# Patient Record
Sex: FEMALE | Race: WHITE | NOT HISPANIC OR LATINO
[De-identification: names, ages, dates, MRNs, and addresses within clinical notes are randomized per-mention and may not be internally consistent; named-entity substitution may affect disease eponyms.]

---

## 2017-01-26 ENCOUNTER — RESULT REVIEW (OUTPATIENT)
Age: 32
End: 2017-01-26

## 2017-01-27 ENCOUNTER — INPATIENT (INPATIENT)
Facility: HOSPITAL | Age: 32
LOS: 1 days | Discharge: HOME | End: 2017-01-29
Attending: OBSTETRICS & GYNECOLOGY | Admitting: OBSTETRICS & GYNECOLOGY
Payer: COMMERCIAL

## 2017-01-27 VITALS
SYSTOLIC BLOOD PRESSURE: 94 MMHG | RESPIRATION RATE: 18 BRPM | HEART RATE: 56 BPM | DIASTOLIC BLOOD PRESSURE: 62 MMHG | TEMPERATURE: 98 F

## 2017-01-27 DIAGNOSIS — O47.1 FALSE LABOR AT OR AFTER 37 COMPLETED WEEKS OF GESTATION: ICD-10-CM

## 2017-01-27 LAB
BASOPHILS # BLD AUTO: 0 K/UL — SIGNIFICANT CHANGE UP (ref 0–0.2)
BASOPHILS NFR BLD AUTO: 0 % — SIGNIFICANT CHANGE UP (ref 0–2)
BLD GP AB SCN SERPL QL: NEGATIVE — SIGNIFICANT CHANGE UP
EOSINOPHIL # BLD AUTO: 0 K/UL — SIGNIFICANT CHANGE UP (ref 0–0.5)
EOSINOPHIL NFR BLD AUTO: 0.2 % — SIGNIFICANT CHANGE UP (ref 0–6)
HCT VFR BLD CALC: 36.7 % — SIGNIFICANT CHANGE UP (ref 34.5–45)
HGB BLD-MCNC: 12.7 G/DL — SIGNIFICANT CHANGE UP (ref 11.5–15.5)
LYMPHOCYTES # BLD AUTO: 1.6 K/UL — SIGNIFICANT CHANGE UP (ref 1–3.3)
LYMPHOCYTES # BLD AUTO: 12.4 % — LOW (ref 13–44)
MCHC RBC-ENTMCNC: 33.4 PG — SIGNIFICANT CHANGE UP (ref 27–34)
MCHC RBC-ENTMCNC: 34.7 GM/DL — SIGNIFICANT CHANGE UP (ref 32–36)
MCV RBC AUTO: 96.3 FL — SIGNIFICANT CHANGE UP (ref 80–100)
MONOCYTES # BLD AUTO: 0.4 K/UL — SIGNIFICANT CHANGE UP (ref 0–0.9)
MONOCYTES NFR BLD AUTO: 3 % — SIGNIFICANT CHANGE UP (ref 2–14)
NEUTROPHILS # BLD AUTO: 10.9 K/UL — HIGH (ref 1.8–7.4)
NEUTROPHILS NFR BLD AUTO: 84.4 % — HIGH (ref 43–77)
PLATELET # BLD AUTO: 150 K/UL — SIGNIFICANT CHANGE UP (ref 150–400)
RBC # BLD: 3.81 M/UL — SIGNIFICANT CHANGE UP (ref 3.8–5.2)
RBC # FLD: 11.3 % — SIGNIFICANT CHANGE UP (ref 10.3–14.5)
RH IG SCN BLD-IMP: POSITIVE — SIGNIFICANT CHANGE UP
T PALLIDUM AB TITR SER: NEGATIVE — SIGNIFICANT CHANGE UP
WBC # BLD: 12.9 K/UL — HIGH (ref 3.8–10.5)
WBC # FLD AUTO: 12.9 K/UL — HIGH (ref 3.8–10.5)

## 2017-01-27 PROCEDURE — 88307 TISSUE EXAM BY PATHOLOGIST: CPT | Mod: 26

## 2017-01-27 RX ORDER — OXYTOCIN 10 UNIT/ML
41.67 VIAL (ML) INJECTION
Qty: 20 | Refills: 0 | Status: DISCONTINUED | OUTPATIENT
Start: 2017-01-27 | End: 2017-01-27

## 2017-01-27 RX ORDER — HYDROCORTISONE 1 %
1 OINTMENT (GRAM) TOPICAL EVERY 4 HOURS
Qty: 0 | Refills: 0 | Status: DISCONTINUED | OUTPATIENT
Start: 2017-01-27 | End: 2017-01-27

## 2017-01-27 RX ORDER — SODIUM CHLORIDE 9 MG/ML
3 INJECTION INTRAMUSCULAR; INTRAVENOUS; SUBCUTANEOUS EVERY 8 HOURS
Qty: 0 | Refills: 0 | Status: DISCONTINUED | OUTPATIENT
Start: 2017-01-27 | End: 2017-01-27

## 2017-01-27 RX ORDER — GLYCERIN ADULT
1 SUPPOSITORY, RECTAL RECTAL AT BEDTIME
Qty: 0 | Refills: 0 | Status: DISCONTINUED | OUTPATIENT
Start: 2017-01-27 | End: 2017-01-29

## 2017-01-27 RX ORDER — DIBUCAINE 1 %
1 OINTMENT (GRAM) RECTAL EVERY 4 HOURS
Qty: 0 | Refills: 0 | Status: DISCONTINUED | OUTPATIENT
Start: 2017-01-27 | End: 2017-01-29

## 2017-01-27 RX ORDER — SODIUM CHLORIDE 9 MG/ML
3 INJECTION INTRAMUSCULAR; INTRAVENOUS; SUBCUTANEOUS EVERY 8 HOURS
Qty: 0 | Refills: 0 | Status: DISCONTINUED | OUTPATIENT
Start: 2017-01-27 | End: 2017-01-29

## 2017-01-27 RX ORDER — IBUPROFEN 200 MG
600 TABLET ORAL EVERY 6 HOURS
Qty: 0 | Refills: 0 | Status: DISCONTINUED | OUTPATIENT
Start: 2017-01-27 | End: 2017-01-29

## 2017-01-27 RX ORDER — DOCUSATE SODIUM 100 MG
100 CAPSULE ORAL
Qty: 0 | Refills: 0 | Status: DISCONTINUED | OUTPATIENT
Start: 2017-01-27 | End: 2017-01-29

## 2017-01-27 RX ORDER — ACETAMINOPHEN 500 MG
975 TABLET ORAL EVERY 6 HOURS
Qty: 0 | Refills: 0 | Status: DISCONTINUED | OUTPATIENT
Start: 2017-01-27 | End: 2017-01-29

## 2017-01-27 RX ORDER — AER TRAVELER 0.5 G/1
1 SOLUTION RECTAL; TOPICAL EVERY 4 HOURS
Qty: 0 | Refills: 0 | Status: DISCONTINUED | OUTPATIENT
Start: 2017-01-27 | End: 2017-01-29

## 2017-01-27 RX ORDER — SIMETHICONE 80 MG/1
80 TABLET, CHEWABLE ORAL EVERY 6 HOURS
Qty: 0 | Refills: 0 | Status: DISCONTINUED | OUTPATIENT
Start: 2017-01-27 | End: 2017-01-29

## 2017-01-27 RX ORDER — TETANUS TOXOID, REDUCED DIPHTHERIA TOXOID AND ACELLULAR PERTUSSIS VACCINE, ADSORBED 5; 2.5; 8; 8; 2.5 [IU]/.5ML; [IU]/.5ML; UG/.5ML; UG/.5ML; UG/.5ML
0.5 SUSPENSION INTRAMUSCULAR ONCE
Qty: 0 | Refills: 0 | Status: COMPLETED | OUTPATIENT
Start: 2017-01-27

## 2017-01-27 RX ORDER — OXYCODONE HYDROCHLORIDE 5 MG/1
5 TABLET ORAL
Qty: 0 | Refills: 0 | Status: DISCONTINUED | OUTPATIENT
Start: 2017-01-27 | End: 2017-01-29

## 2017-01-27 RX ORDER — CITRIC ACID/SODIUM CITRATE 300-500 MG
15 SOLUTION, ORAL ORAL EVERY 4 HOURS
Qty: 0 | Refills: 0 | Status: DISCONTINUED | OUTPATIENT
Start: 2017-01-27 | End: 2017-01-27

## 2017-01-27 RX ORDER — DIBUCAINE 1 %
1 OINTMENT (GRAM) RECTAL EVERY 4 HOURS
Qty: 0 | Refills: 0 | Status: DISCONTINUED | OUTPATIENT
Start: 2017-01-27 | End: 2017-01-27

## 2017-01-27 RX ORDER — SODIUM CHLORIDE 9 MG/ML
1000 INJECTION, SOLUTION INTRAVENOUS
Qty: 0 | Refills: 0 | Status: DISCONTINUED | OUTPATIENT
Start: 2017-01-27 | End: 2017-01-27

## 2017-01-27 RX ORDER — LANOLIN
1 OINTMENT (GRAM) TOPICAL EVERY 6 HOURS
Qty: 0 | Refills: 0 | Status: DISCONTINUED | OUTPATIENT
Start: 2017-01-27 | End: 2017-01-29

## 2017-01-27 RX ORDER — KETOROLAC TROMETHAMINE 30 MG/ML
30 SYRINGE (ML) INJECTION ONCE
Qty: 0 | Refills: 0 | Status: DISCONTINUED | OUTPATIENT
Start: 2017-01-27 | End: 2017-01-27

## 2017-01-27 RX ORDER — PRAMOXINE HYDROCHLORIDE 150 MG/15G
1 AEROSOL, FOAM RECTAL EVERY 4 HOURS
Qty: 0 | Refills: 0 | Status: DISCONTINUED | OUTPATIENT
Start: 2017-01-27 | End: 2017-01-27

## 2017-01-27 RX ORDER — OXYTOCIN 10 UNIT/ML
41.67 VIAL (ML) INJECTION
Qty: 20 | Refills: 0 | Status: DISCONTINUED | OUTPATIENT
Start: 2017-01-27 | End: 2017-01-29

## 2017-01-27 RX ORDER — HYDROCORTISONE 1 %
1 OINTMENT (GRAM) TOPICAL EVERY 4 HOURS
Qty: 0 | Refills: 0 | Status: DISCONTINUED | OUTPATIENT
Start: 2017-01-27 | End: 2017-01-29

## 2017-01-27 RX ORDER — OXYTOCIN 10 UNIT/ML
333.33 VIAL (ML) INJECTION
Qty: 20 | Refills: 0 | Status: DISCONTINUED | OUTPATIENT
Start: 2017-01-27 | End: 2017-01-27

## 2017-01-27 RX ORDER — MAGNESIUM HYDROXIDE 400 MG/1
30 TABLET, CHEWABLE ORAL
Qty: 0 | Refills: 0 | Status: DISCONTINUED | OUTPATIENT
Start: 2017-01-27 | End: 2017-01-29

## 2017-01-27 RX ORDER — DIPHENHYDRAMINE HCL 50 MG
25 CAPSULE ORAL EVERY 6 HOURS
Qty: 0 | Refills: 0 | Status: DISCONTINUED | OUTPATIENT
Start: 2017-01-27 | End: 2017-01-29

## 2017-01-27 RX ORDER — PRAMOXINE HYDROCHLORIDE 150 MG/15G
1 AEROSOL, FOAM RECTAL EVERY 4 HOURS
Qty: 0 | Refills: 0 | Status: DISCONTINUED | OUTPATIENT
Start: 2017-01-27 | End: 2017-01-29

## 2017-01-27 RX ORDER — SODIUM CHLORIDE 9 MG/ML
500 INJECTION, SOLUTION INTRAVENOUS ONCE
Qty: 0 | Refills: 0 | Status: COMPLETED | OUTPATIENT
Start: 2017-01-27 | End: 2017-01-27

## 2017-01-27 RX ORDER — AER TRAVELER 0.5 G/1
1 SOLUTION RECTAL; TOPICAL EVERY 4 HOURS
Qty: 0 | Refills: 0 | Status: DISCONTINUED | OUTPATIENT
Start: 2017-01-27 | End: 2017-01-27

## 2017-01-27 RX ORDER — OXYCODONE HYDROCHLORIDE 5 MG/1
5 TABLET ORAL EVERY 4 HOURS
Qty: 0 | Refills: 0 | Status: DISCONTINUED | OUTPATIENT
Start: 2017-01-27 | End: 2017-01-29

## 2017-01-27 RX ADMIN — Medication 30 MILLIGRAM(S): at 08:45

## 2017-01-27 RX ADMIN — Medication 125 MILLIUNIT(S)/MIN: at 06:07

## 2017-01-27 RX ADMIN — SODIUM CHLORIDE 1000 MILLILITER(S): 9 INJECTION, SOLUTION INTRAVENOUS at 05:53

## 2017-01-27 NOTE — PATIENT PROFILE OB - NS PRO CONTRA FLU CONTRAIND
Allergy sensitivity to eggs, thimerosal (if included in the vaccine), or any other component of the vaccine (i.e., aminoglycosides)

## 2017-01-28 ENCOUNTER — TRANSCRIPTION ENCOUNTER (OUTPATIENT)
Age: 32
End: 2017-01-28

## 2017-01-28 DIAGNOSIS — Z83.2 FAMILY HISTORY OF DISEASES OF THE BLOOD AND BLOOD-FORMING ORGANS AND CERTAIN DISORDERS INVOLVING THE IMMUNE MECHANISM: ICD-10-CM

## 2017-01-28 LAB
HCT VFR BLD CALC: 34.9 % — SIGNIFICANT CHANGE UP (ref 34.5–45)
HGB BLD-MCNC: 12 G/DL — SIGNIFICANT CHANGE UP (ref 11.5–15.5)

## 2017-01-28 RX ORDER — LEVOTHYROXINE SODIUM 125 MCG
75 TABLET ORAL DAILY
Qty: 0 | Refills: 0 | Status: DISCONTINUED | OUTPATIENT
Start: 2017-01-28 | End: 2017-01-29

## 2017-01-28 RX ORDER — LEVOTHYROXINE SODIUM 125 MCG
1 TABLET ORAL
Qty: 0 | Refills: 0 | COMMUNITY

## 2017-01-28 NOTE — DISCHARGE NOTE OB - CARE PLAN
Principal Discharge DX:	Vaginal delivery  Goal:	home  Instructions for follow-up, activity and diet:	as per PMD

## 2017-01-28 NOTE — DISCHARGE NOTE OB - CARE PROVIDER_API CALL
Radha Neal), Obstetrics and Gynecology  [address]  [place] [address]  Phone: [phone]  Fax: [phone]

## 2017-01-28 NOTE — DISCHARGE NOTE OB - MATERIALS PROVIDED
Breastfeeding Guide and Packet/Breastfeeding Log/Guide to Postpartum Care/Birth Certificate Instructions/Breastfeeding Mother’s Support Group Information/[facility] Hearing Screen Program/[facility]  Screening Program Breastfeeding Guide and Packet/[facility] Hearing Screen Program/Breastfeeding Log/Birth Certificate Instructions/[facility]  Screening Program/Shaken Baby Prevention Handout/Breastfeeding Mother’s Support Group Information/Guide to Postpartum Care

## 2017-01-28 NOTE — DISCHARGE NOTE OB - PATIENT PORTAL LINK FT
“You can access the FollowHealth Patient Portal, offered by [facility], by registering with the following website: [email or web address]”

## 2017-01-29 VITALS — HEART RATE: 48 BPM

## 2017-01-29 PROCEDURE — 86900 BLOOD TYPING SEROLOGIC ABO: CPT

## 2017-01-29 PROCEDURE — 59050 FETAL MONITOR W/REPORT: CPT

## 2017-01-29 PROCEDURE — 86850 RBC ANTIBODY SCREEN: CPT

## 2017-01-29 PROCEDURE — 86780 TREPONEMA PALLIDUM: CPT

## 2017-01-29 PROCEDURE — G0463: CPT

## 2017-01-29 PROCEDURE — 59025 FETAL NON-STRESS TEST: CPT

## 2017-01-29 PROCEDURE — 85018 HEMOGLOBIN: CPT

## 2017-01-29 PROCEDURE — 85027 COMPLETE CBC AUTOMATED: CPT

## 2017-01-29 PROCEDURE — 88307 TISSUE EXAM BY PATHOLOGIST: CPT

## 2017-01-29 PROCEDURE — 86901 BLOOD TYPING SEROLOGIC RH(D): CPT

## 2017-01-29 RX ORDER — TETANUS TOXOID, REDUCED DIPHTHERIA TOXOID AND ACELLULAR PERTUSSIS VACCINE, ADSORBED 5; 2.5; 8; 8; 2.5 [IU]/.5ML; [IU]/.5ML; UG/.5ML; UG/.5ML; UG/.5ML
0.5 SUSPENSION INTRAMUSCULAR ONCE
Qty: 0 | Refills: 0 | Status: DISCONTINUED | OUTPATIENT
Start: 2017-01-29 | End: 2017-01-29

## 2017-01-29 RX ADMIN — Medication 75 MICROGRAM(S): at 06:39

## 2017-02-02 ENCOUNTER — APPOINTMENT (OUTPATIENT)
Dept: ANTEPARTUM | Facility: CLINIC | Age: 32
End: 2017-02-02

## 2017-02-03 LAB — SURGICAL PATHOLOGY STUDY: SIGNIFICANT CHANGE UP

## 2017-12-05 ENCOUNTER — OUTPATIENT (OUTPATIENT)
Dept: OUTPATIENT SERVICES | Age: 32
LOS: 1 days | End: 2017-12-05

## 2017-12-05 PROBLEM — Z83.2 FAMILY HISTORY OF HEREDITARY SPHEROCYTOSIS: Status: ACTIVE | Noted: 2017-12-05

## 2018-05-24 ENCOUNTER — RESULT REVIEW (OUTPATIENT)
Age: 33
End: 2018-05-24

## 2018-06-22 ENCOUNTER — APPOINTMENT (OUTPATIENT)
Dept: ANTEPARTUM | Facility: CLINIC | Age: 33
End: 2018-06-22
Payer: COMMERCIAL

## 2018-06-22 ENCOUNTER — ASOB RESULT (OUTPATIENT)
Age: 33
End: 2018-06-22

## 2018-06-22 PROCEDURE — 76813 OB US NUCHAL MEAS 1 GEST: CPT

## 2018-06-22 PROCEDURE — 36416 COLLJ CAPILLARY BLOOD SPEC: CPT

## 2018-08-16 ENCOUNTER — APPOINTMENT (OUTPATIENT)
Dept: ANTEPARTUM | Facility: CLINIC | Age: 33
End: 2018-08-16
Payer: COMMERCIAL

## 2018-08-16 ENCOUNTER — ASOB RESULT (OUTPATIENT)
Age: 33
End: 2018-08-16

## 2018-08-16 PROCEDURE — 99241 OFFICE CONSULTATION NEW/ESTAB PATIENT 15 MIN: CPT | Mod: 25

## 2018-08-16 PROCEDURE — 76811 OB US DETAILED SNGL FETUS: CPT

## 2018-08-16 PROCEDURE — 76817 TRANSVAGINAL US OBSTETRIC: CPT | Mod: 59

## 2018-10-11 ENCOUNTER — ASOB RESULT (OUTPATIENT)
Age: 33
End: 2018-10-11

## 2018-10-11 ENCOUNTER — APPOINTMENT (OUTPATIENT)
Dept: ANTEPARTUM | Facility: CLINIC | Age: 33
End: 2018-10-11
Payer: COMMERCIAL

## 2018-10-11 PROCEDURE — 76816 OB US FOLLOW-UP PER FETUS: CPT

## 2018-11-12 ENCOUNTER — APPOINTMENT (OUTPATIENT)
Dept: ANTEPARTUM | Facility: CLINIC | Age: 33
End: 2018-11-12

## 2018-12-19 ENCOUNTER — APPOINTMENT (OUTPATIENT)
Dept: ANTEPARTUM | Facility: CLINIC | Age: 33
End: 2018-12-19
Payer: COMMERCIAL

## 2018-12-19 ENCOUNTER — ASOB RESULT (OUTPATIENT)
Age: 33
End: 2018-12-19

## 2018-12-19 PROCEDURE — 76816 OB US FOLLOW-UP PER FETUS: CPT

## 2019-01-05 ENCOUNTER — INPATIENT (INPATIENT)
Facility: HOSPITAL | Age: 34
LOS: 1 days | Discharge: HOME | End: 2019-01-07
Attending: OBSTETRICS & GYNECOLOGY | Admitting: OBSTETRICS & GYNECOLOGY
Payer: COMMERCIAL

## 2019-01-05 VITALS
TEMPERATURE: 98 F | RESPIRATION RATE: 19 BRPM | HEART RATE: 56 BPM | SYSTOLIC BLOOD PRESSURE: 107 MMHG | DIASTOLIC BLOOD PRESSURE: 54 MMHG

## 2019-01-05 DIAGNOSIS — Z34.80 ENCOUNTER FOR SUPERVISION OF OTHER NORMAL PREGNANCY, UNSPECIFIED TRIMESTER: ICD-10-CM

## 2019-01-05 DIAGNOSIS — Z3A.00 WEEKS OF GESTATION OF PREGNANCY NOT SPECIFIED: ICD-10-CM

## 2019-01-05 DIAGNOSIS — O26.899 OTHER SPECIFIED PREGNANCY RELATED CONDITIONS, UNSPECIFIED TRIMESTER: ICD-10-CM

## 2019-01-05 LAB
BASOPHILS # BLD AUTO: 0.1 K/UL — SIGNIFICANT CHANGE UP (ref 0–0.2)
BASOPHILS NFR BLD AUTO: 0.6 % — SIGNIFICANT CHANGE UP (ref 0–2)
BLD GP AB SCN SERPL QL: NEGATIVE — SIGNIFICANT CHANGE UP
EOSINOPHIL # BLD AUTO: 0.1 K/UL — SIGNIFICANT CHANGE UP (ref 0–0.5)
EOSINOPHIL NFR BLD AUTO: 1.2 % — SIGNIFICANT CHANGE UP (ref 0–6)
HCT VFR BLD CALC: 36.7 % — SIGNIFICANT CHANGE UP (ref 34.5–45)
HGB BLD-MCNC: 12.9 G/DL — SIGNIFICANT CHANGE UP (ref 11.5–15.5)
LYMPHOCYTES # BLD AUTO: 2.5 K/UL — SIGNIFICANT CHANGE UP (ref 1–3.3)
LYMPHOCYTES # BLD AUTO: 27.4 % — SIGNIFICANT CHANGE UP (ref 13–44)
MCHC RBC-ENTMCNC: 33.2 PG — SIGNIFICANT CHANGE UP (ref 27–34)
MCHC RBC-ENTMCNC: 35.1 GM/DL — SIGNIFICANT CHANGE UP (ref 32–36)
MCV RBC AUTO: 94.7 FL — SIGNIFICANT CHANGE UP (ref 80–100)
MONOCYTES # BLD AUTO: 0.5 K/UL — SIGNIFICANT CHANGE UP (ref 0–0.9)
MONOCYTES NFR BLD AUTO: 5 % — SIGNIFICANT CHANGE UP (ref 2–14)
NEUTROPHILS # BLD AUTO: 6 K/UL — SIGNIFICANT CHANGE UP (ref 1.8–7.4)
NEUTROPHILS NFR BLD AUTO: 65.7 % — SIGNIFICANT CHANGE UP (ref 43–77)
PLATELET # BLD AUTO: 169 K/UL — SIGNIFICANT CHANGE UP (ref 150–400)
RBC # BLD: 3.88 M/UL — SIGNIFICANT CHANGE UP (ref 3.8–5.2)
RBC # FLD: 11.8 % — SIGNIFICANT CHANGE UP (ref 10.3–14.5)
RH IG SCN BLD-IMP: POSITIVE — SIGNIFICANT CHANGE UP
WBC # BLD: 9.2 K/UL — SIGNIFICANT CHANGE UP (ref 3.8–10.5)
WBC # FLD AUTO: 9.2 K/UL — SIGNIFICANT CHANGE UP (ref 3.8–10.5)

## 2019-01-05 RX ORDER — PRAMOXINE HYDROCHLORIDE 150 MG/15G
1 AEROSOL, FOAM RECTAL EVERY 4 HOURS
Qty: 0 | Refills: 0 | Status: DISCONTINUED | OUTPATIENT
Start: 2019-01-05 | End: 2019-01-07

## 2019-01-05 RX ORDER — OXYTOCIN 10 UNIT/ML
41.67 VIAL (ML) INJECTION
Qty: 20 | Refills: 0 | Status: DISCONTINUED | OUTPATIENT
Start: 2019-01-05 | End: 2019-01-05

## 2019-01-05 RX ORDER — IBUPROFEN 200 MG
600 TABLET ORAL EVERY 6 HOURS
Qty: 0 | Refills: 0 | Status: DISCONTINUED | OUTPATIENT
Start: 2019-01-05 | End: 2019-01-07

## 2019-01-05 RX ORDER — HYDROCORTISONE 1 %
1 OINTMENT (GRAM) TOPICAL EVERY 4 HOURS
Qty: 0 | Refills: 0 | Status: DISCONTINUED | OUTPATIENT
Start: 2019-01-05 | End: 2019-01-07

## 2019-01-05 RX ORDER — OXYCODONE HYDROCHLORIDE 5 MG/1
5 TABLET ORAL EVERY 4 HOURS
Qty: 0 | Refills: 0 | Status: DISCONTINUED | OUTPATIENT
Start: 2019-01-05 | End: 2019-01-07

## 2019-01-05 RX ORDER — SODIUM CHLORIDE 9 MG/ML
1000 INJECTION, SOLUTION INTRAVENOUS
Qty: 0 | Refills: 0 | Status: DISCONTINUED | OUTPATIENT
Start: 2019-01-05 | End: 2019-01-05

## 2019-01-05 RX ORDER — HYDROCORTISONE 1 %
1 OINTMENT (GRAM) TOPICAL EVERY 4 HOURS
Qty: 0 | Refills: 0 | Status: DISCONTINUED | OUTPATIENT
Start: 2019-01-05 | End: 2019-01-05

## 2019-01-05 RX ORDER — DOCUSATE SODIUM 100 MG
100 CAPSULE ORAL
Qty: 0 | Refills: 0 | Status: DISCONTINUED | OUTPATIENT
Start: 2019-01-05 | End: 2019-01-07

## 2019-01-05 RX ORDER — PRAMOXINE HYDROCHLORIDE 150 MG/15G
1 AEROSOL, FOAM RECTAL EVERY 4 HOURS
Qty: 0 | Refills: 0 | Status: DISCONTINUED | OUTPATIENT
Start: 2019-01-05 | End: 2019-01-05

## 2019-01-05 RX ORDER — DIBUCAINE 1 %
1 OINTMENT (GRAM) RECTAL EVERY 4 HOURS
Qty: 0 | Refills: 0 | Status: DISCONTINUED | OUTPATIENT
Start: 2019-01-05 | End: 2019-01-05

## 2019-01-05 RX ORDER — AER TRAVELER 0.5 G/1
1 SOLUTION RECTAL; TOPICAL EVERY 4 HOURS
Qty: 0 | Refills: 0 | Status: DISCONTINUED | OUTPATIENT
Start: 2019-01-05 | End: 2019-01-05

## 2019-01-05 RX ORDER — DIBUCAINE 1 %
1 OINTMENT (GRAM) RECTAL EVERY 4 HOURS
Qty: 0 | Refills: 0 | Status: DISCONTINUED | OUTPATIENT
Start: 2019-01-05 | End: 2019-01-07

## 2019-01-05 RX ORDER — SIMETHICONE 80 MG/1
80 TABLET, CHEWABLE ORAL EVERY 6 HOURS
Qty: 0 | Refills: 0 | Status: DISCONTINUED | OUTPATIENT
Start: 2019-01-05 | End: 2019-01-07

## 2019-01-05 RX ORDER — OXYCODONE HYDROCHLORIDE 5 MG/1
5 TABLET ORAL
Qty: 0 | Refills: 0 | Status: DISCONTINUED | OUTPATIENT
Start: 2019-01-05 | End: 2019-01-07

## 2019-01-05 RX ORDER — ACETAMINOPHEN 500 MG
975 TABLET ORAL EVERY 6 HOURS
Qty: 0 | Refills: 0 | Status: DISCONTINUED | OUTPATIENT
Start: 2019-01-05 | End: 2019-01-07

## 2019-01-05 RX ORDER — ACETAMINOPHEN 500 MG
975 TABLET ORAL EVERY 6 HOURS
Qty: 0 | Refills: 0 | Status: COMPLETED | OUTPATIENT
Start: 2019-01-05 | End: 2019-12-04

## 2019-01-05 RX ORDER — KETOROLAC TROMETHAMINE 30 MG/ML
30 SYRINGE (ML) INJECTION ONCE
Qty: 0 | Refills: 0 | Status: DISCONTINUED | OUTPATIENT
Start: 2019-01-05 | End: 2019-01-07

## 2019-01-05 RX ORDER — SODIUM CHLORIDE 9 MG/ML
3 INJECTION INTRAMUSCULAR; INTRAVENOUS; SUBCUTANEOUS EVERY 8 HOURS
Qty: 0 | Refills: 0 | Status: DISCONTINUED | OUTPATIENT
Start: 2019-01-05 | End: 2019-01-07

## 2019-01-05 RX ORDER — MAGNESIUM HYDROXIDE 400 MG/1
30 TABLET, CHEWABLE ORAL
Qty: 0 | Refills: 0 | Status: DISCONTINUED | OUTPATIENT
Start: 2019-01-05 | End: 2019-01-07

## 2019-01-05 RX ORDER — DIPHENHYDRAMINE HCL 50 MG
25 CAPSULE ORAL EVERY 6 HOURS
Qty: 0 | Refills: 0 | Status: DISCONTINUED | OUTPATIENT
Start: 2019-01-05 | End: 2019-01-07

## 2019-01-05 RX ORDER — AER TRAVELER 0.5 G/1
1 SOLUTION RECTAL; TOPICAL EVERY 4 HOURS
Qty: 0 | Refills: 0 | Status: DISCONTINUED | OUTPATIENT
Start: 2019-01-05 | End: 2019-01-07

## 2019-01-05 RX ORDER — LEVOTHYROXINE SODIUM 125 MCG
88 TABLET ORAL DAILY
Qty: 0 | Refills: 0 | Status: DISCONTINUED | OUTPATIENT
Start: 2019-01-05 | End: 2019-01-07

## 2019-01-05 RX ORDER — CITRIC ACID/SODIUM CITRATE 300-500 MG
15 SOLUTION, ORAL ORAL EVERY 4 HOURS
Qty: 0 | Refills: 0 | Status: DISCONTINUED | OUTPATIENT
Start: 2019-01-05 | End: 2019-01-05

## 2019-01-05 RX ORDER — IBUPROFEN 200 MG
600 TABLET ORAL EVERY 6 HOURS
Qty: 0 | Refills: 0 | Status: COMPLETED | OUTPATIENT
Start: 2019-01-05 | End: 2019-12-04

## 2019-01-05 RX ORDER — TETANUS TOXOID, REDUCED DIPHTHERIA TOXOID AND ACELLULAR PERTUSSIS VACCINE, ADSORBED 5; 2.5; 8; 8; 2.5 [IU]/.5ML; [IU]/.5ML; UG/.5ML; UG/.5ML; UG/.5ML
0.5 SUSPENSION INTRAMUSCULAR ONCE
Qty: 0 | Refills: 0 | Status: COMPLETED | OUTPATIENT
Start: 2019-01-05

## 2019-01-05 RX ORDER — SODIUM CHLORIDE 9 MG/ML
500 INJECTION, SOLUTION INTRAVENOUS ONCE
Qty: 0 | Refills: 0 | Status: DISCONTINUED | OUTPATIENT
Start: 2019-01-05 | End: 2019-01-05

## 2019-01-05 RX ORDER — OXYTOCIN 10 UNIT/ML
333.33 VIAL (ML) INJECTION
Qty: 20 | Refills: 0 | Status: DISCONTINUED | OUTPATIENT
Start: 2019-01-05 | End: 2019-01-05

## 2019-01-05 RX ORDER — SODIUM CHLORIDE 9 MG/ML
3 INJECTION INTRAMUSCULAR; INTRAVENOUS; SUBCUTANEOUS EVERY 8 HOURS
Qty: 0 | Refills: 0 | Status: DISCONTINUED | OUTPATIENT
Start: 2019-01-05 | End: 2019-01-05

## 2019-01-05 RX ORDER — GLYCERIN ADULT
1 SUPPOSITORY, RECTAL RECTAL AT BEDTIME
Qty: 0 | Refills: 0 | Status: DISCONTINUED | OUTPATIENT
Start: 2019-01-05 | End: 2019-01-07

## 2019-01-05 RX ORDER — LANOLIN
1 OINTMENT (GRAM) TOPICAL EVERY 6 HOURS
Qty: 0 | Refills: 0 | Status: DISCONTINUED | OUTPATIENT
Start: 2019-01-05 | End: 2019-01-07

## 2019-01-05 RX ORDER — BENZOCAINE 10 %
1 GEL (GRAM) MUCOUS MEMBRANE EVERY 6 HOURS
Qty: 0 | Refills: 0 | Status: DISCONTINUED | OUTPATIENT
Start: 2019-01-05 | End: 2019-01-07

## 2019-01-05 RX ADMIN — SODIUM CHLORIDE 3 MILLILITER(S): 9 INJECTION INTRAMUSCULAR; INTRAVENOUS; SUBCUTANEOUS at 23:44

## 2019-01-05 RX ADMIN — Medication 88 MICROGRAM(S): at 07:08

## 2019-01-05 RX ADMIN — Medication 125 MILLIUNIT(S)/MIN: at 04:55

## 2019-01-05 RX ADMIN — Medication 600 MILLIGRAM(S): at 18:06

## 2019-01-06 LAB
HCT VFR BLD CALC: 34.3 % — LOW (ref 34.5–45)
HGB BLD-MCNC: 12.1 G/DL — SIGNIFICANT CHANGE UP (ref 11.5–15.5)
T PALLIDUM AB TITR SER: NEGATIVE — SIGNIFICANT CHANGE UP

## 2019-01-06 RX ADMIN — Medication 600 MILLIGRAM(S): at 23:00

## 2019-01-06 RX ADMIN — Medication 88 MICROGRAM(S): at 05:11

## 2019-01-06 RX ADMIN — Medication 975 MILLIGRAM(S): at 18:06

## 2019-01-06 RX ADMIN — Medication 600 MILLIGRAM(S): at 22:28

## 2019-01-06 RX ADMIN — SODIUM CHLORIDE 3 MILLILITER(S): 9 INJECTION INTRAMUSCULAR; INTRAVENOUS; SUBCUTANEOUS at 05:10

## 2019-01-06 RX ADMIN — Medication 975 MILLIGRAM(S): at 17:34

## 2019-01-06 NOTE — PROGRESS NOTE ADULT - SUBJECTIVE AND OBJECTIVE BOX
Vital Signs Last 24 Hrs  T(C): 37.1 (06 Jan 2019 06:05), Max: 37.1 (06 Jan 2019 06:05)  T(F): 98.7 (06 Jan 2019 06:05), Max: 98.7 (06 Jan 2019 06:05)  HR: 61 (06 Jan 2019 06:05) (61 - 70)  BP: 91/60 (06 Jan 2019 06:05) (91/60 - 116/75)  BP(mean): --  RR: 17 (06 Jan 2019 06:05) (17 - 18)  SpO2: 99% (06 Jan 2019 06:05) (98% - 99%)    Abdomen soft  Fundus Firm  Lochia WNL  Voiding  Stable

## 2019-01-06 NOTE — PROGRESS NOTE ADULT - SUBJECTIVE AND OBJECTIVE BOX
OB Progress Note:  PPD#1    S: 34yo  PPD#1 s/p . Patient feels well. Pain is well controlled. She is tolerating a regular diet and passing flatus. She is voiding spontaneously, and ambulating without difficulty. Denies CP/SOB. Denies lightheadedness/dizziness. Denies N/V.    O:  Vitals:  Vital Signs Last 24 Hrs  T(C): 37.1 (2019 06:05), Max: 37.1 (2019 06:05)  T(F): 98.7 (2019 06:05), Max: 98.7 (2019 06:05)  HR: 61 (2019 06:05) (61 - 70)  BP: 91/60 (2019 06:05) (91/60 - 116/75)  BP(mean): --  RR: 17 (2019 06:05) (17 - 18)  SpO2: 99% (2019 06:05) (98% - 99%)    MEDICATIONS  (STANDING):  acetaminophen   Tablet .. 975 milliGRAM(s) Oral every 6 hours  diphtheria/tetanus/pertussis (acellular) Vaccine (ADAcel) 0.5 milliLiter(s) IntraMuscular once  ibuprofen  Tablet. 600 milliGRAM(s) Oral every 6 hours  ketorolac   Injectable 30 milliGRAM(s) IV Push once  levothyroxine 88 MICROGram(s) Oral daily  oxyCODONE    IR 5 milliGRAM(s) Oral every 3 hours  prenatal multivitamin 1 Tablet(s) Oral daily  sodium chloride 0.9% lock flush 3 milliLiter(s) IV Push every 8 hours      Labs:  Blood type: B Positive  Rubella IgG: RPR: Negative                          12.9   9.2 >-----------< 169    (  @ 03:46 )             36.7                  Physical Exam:  General: NAD  Abdomen: soft, non-tender, non-distended, fundus firm  Vaginal: Lochia wnl  Extremities: No erythema/edema

## 2019-01-06 NOTE — PROGRESS NOTE ADULT - PROBLEM SELECTOR PLAN 1
- Pain well controlled, continue current pain regimen  - Increase ambulation, SCDs when not ambulating  - Continue regular diet    Celeste Price PGY1

## 2019-01-07 ENCOUNTER — APPOINTMENT (OUTPATIENT)
Dept: ANTEPARTUM | Facility: CLINIC | Age: 34
End: 2019-01-07

## 2019-01-07 ENCOUNTER — TRANSCRIPTION ENCOUNTER (OUTPATIENT)
Age: 34
End: 2019-01-07

## 2019-01-07 VITALS
HEART RATE: 66 BPM | OXYGEN SATURATION: 100 % | TEMPERATURE: 98 F | SYSTOLIC BLOOD PRESSURE: 96 MMHG | DIASTOLIC BLOOD PRESSURE: 65 MMHG | RESPIRATION RATE: 18 BRPM

## 2019-01-07 PROCEDURE — 59025 FETAL NON-STRESS TEST: CPT

## 2019-01-07 PROCEDURE — 59050 FETAL MONITOR W/REPORT: CPT

## 2019-01-07 PROCEDURE — 86780 TREPONEMA PALLIDUM: CPT

## 2019-01-07 PROCEDURE — G0463: CPT

## 2019-01-07 PROCEDURE — 85027 COMPLETE CBC AUTOMATED: CPT

## 2019-01-07 PROCEDURE — 90715 TDAP VACCINE 7 YRS/> IM: CPT

## 2019-01-07 PROCEDURE — 86900 BLOOD TYPING SEROLOGIC ABO: CPT

## 2019-01-07 PROCEDURE — 86850 RBC ANTIBODY SCREEN: CPT

## 2019-01-07 PROCEDURE — 85014 HEMATOCRIT: CPT

## 2019-01-07 PROCEDURE — 85018 HEMOGLOBIN: CPT

## 2019-01-07 PROCEDURE — 86901 BLOOD TYPING SEROLOGIC RH(D): CPT

## 2019-01-07 RX ORDER — LEVOTHYROXINE SODIUM 125 MCG
1 TABLET ORAL
Qty: 0 | Refills: 0 | COMMUNITY

## 2019-01-07 RX ORDER — TETANUS TOXOID, REDUCED DIPHTHERIA TOXOID AND ACELLULAR PERTUSSIS VACCINE, ADSORBED 5; 2.5; 8; 8; 2.5 [IU]/.5ML; [IU]/.5ML; UG/.5ML; UG/.5ML; UG/.5ML
0.5 SUSPENSION INTRAMUSCULAR ONCE
Qty: 0 | Refills: 0 | Status: COMPLETED | OUTPATIENT
Start: 2019-01-07 | End: 2019-01-07

## 2019-01-07 RX ADMIN — Medication 88 MICROGRAM(S): at 06:00

## 2019-01-07 RX ADMIN — TETANUS TOXOID, REDUCED DIPHTHERIA TOXOID AND ACELLULAR PERTUSSIS VACCINE, ADSORBED 0.5 MILLILITER(S): 5; 2.5; 8; 8; 2.5 SUSPENSION INTRAMUSCULAR at 05:58

## 2019-01-07 NOTE — PROGRESS NOTE ADULT - SUBJECTIVE AND OBJECTIVE BOX
VS: Vital Signs Last 24 Hrs  T(C): 36.4 (07 Jan 2019 05:32), Max: 36.9 (06 Jan 2019 17:32)  T(F): 97.5 (07 Jan 2019 05:32), Max: 98.4 (06 Jan 2019 17:32)  HR: 66 (07 Jan 2019 05:32) (66 - 78)  BP: 96/65 (07 Jan 2019 05:32) (96/65 - 112/78)  BP(mean): --  RR: 18 (07 Jan 2019 05:32) (18 - 18)  SpO2: 100% (07 Jan 2019 05:32) (99% - 100%)    Abdomen soft, fundus firm  Lochia WNL  Voiding, stable

## 2019-01-07 NOTE — DISCHARGE NOTE OB - CARE PLAN
Principal Discharge DX:	Vaginal delivery  Goal:	recovery  Assessment and plan of treatment:	Regular diet, activity as tolerated, follow up with MD in 4 weeks.

## 2019-01-07 NOTE — DISCHARGE NOTE OB - CARE PROVIDER_API CALL
Radha Neal), Obstetrics and Gynecology  [address]  [address]  [place] [address]  Phone: [phone]  Fax: [phone]

## 2019-08-07 ENCOUNTER — RESULT REVIEW (OUTPATIENT)
Age: 34
End: 2019-08-07

## 2021-03-16 ENCOUNTER — RESULT REVIEW (OUTPATIENT)
Age: 36
End: 2021-03-16

## 2021-03-17 NOTE — DISCHARGE NOTE OB - PATIENT PORTAL LINK FT
Placed urinary drainage leg bag on pt. Pt understood instructions for leg bag and did not have any additional questions.       Piotr Montes  03/17/21 [id number] You can access the [facility] Patient Portal, offered by [facility], by registering with the following website: [email or web address]

## 2021-04-07 ENCOUNTER — ASOB RESULT (OUTPATIENT)
Age: 36
End: 2021-04-07

## 2021-04-07 ENCOUNTER — APPOINTMENT (OUTPATIENT)
Dept: ANTEPARTUM | Facility: CLINIC | Age: 36
End: 2021-04-07
Payer: COMMERCIAL

## 2021-04-07 PROCEDURE — 76813 OB US NUCHAL MEAS 1 GEST: CPT | Mod: 59

## 2021-04-07 PROCEDURE — 36416 COLLJ CAPILLARY BLOOD SPEC: CPT

## 2021-04-07 PROCEDURE — 76801 OB US < 14 WKS SINGLE FETUS: CPT

## 2021-04-07 PROCEDURE — 99072 ADDL SUPL MATRL&STAF TM PHE: CPT

## 2021-06-02 ENCOUNTER — APPOINTMENT (OUTPATIENT)
Dept: ANTEPARTUM | Facility: CLINIC | Age: 36
End: 2021-06-02
Payer: COMMERCIAL

## 2021-06-02 ENCOUNTER — ASOB RESULT (OUTPATIENT)
Age: 36
End: 2021-06-02

## 2021-06-02 PROCEDURE — 99072 ADDL SUPL MATRL&STAF TM PHE: CPT

## 2021-06-02 PROCEDURE — 76811 OB US DETAILED SNGL FETUS: CPT

## 2021-09-16 ENCOUNTER — OUTPATIENT (OUTPATIENT)
Dept: OUTPATIENT SERVICES | Facility: HOSPITAL | Age: 36
LOS: 1 days | End: 2021-09-16
Payer: COMMERCIAL

## 2021-09-16 DIAGNOSIS — Z11.52 ENCOUNTER FOR SCREENING FOR COVID-19: ICD-10-CM

## 2021-09-18 ENCOUNTER — INPATIENT (INPATIENT)
Facility: HOSPITAL | Age: 36
LOS: 2 days | Discharge: HOME | End: 2021-09-21
Attending: OBSTETRICS & GYNECOLOGY | Admitting: OBSTETRICS & GYNECOLOGY
Payer: COMMERCIAL

## 2021-09-18 VITALS
OXYGEN SATURATION: 98 % | HEART RATE: 63 BPM | SYSTOLIC BLOOD PRESSURE: 99 MMHG | RESPIRATION RATE: 16 BRPM | DIASTOLIC BLOOD PRESSURE: 53 MMHG

## 2021-09-18 DIAGNOSIS — O26.619 LIVER AND BILIARY TRACT DISORDERS IN PREGNANCY, UNSPECIFIED TRIMESTER: ICD-10-CM

## 2021-09-18 RX ORDER — SODIUM CHLORIDE 9 MG/ML
1000 INJECTION, SOLUTION INTRAVENOUS
Refills: 0 | Status: DISCONTINUED | OUTPATIENT
Start: 2021-09-18 | End: 2021-09-19

## 2021-09-18 RX ORDER — CITRIC ACID/SODIUM CITRATE 300-500 MG
15 SOLUTION, ORAL ORAL EVERY 6 HOURS
Refills: 0 | Status: DISCONTINUED | OUTPATIENT
Start: 2021-09-18 | End: 2021-09-19

## 2021-09-18 RX ORDER — OXYTOCIN 10 UNIT/ML
333.33 VIAL (ML) INJECTION
Qty: 20 | Refills: 0 | Status: DISCONTINUED | OUTPATIENT
Start: 2021-09-18 | End: 2021-09-21

## 2021-09-19 LAB
BASOPHILS # BLD AUTO: 0.04 K/UL — SIGNIFICANT CHANGE UP (ref 0–0.2)
BASOPHILS NFR BLD AUTO: 0.6 % — SIGNIFICANT CHANGE UP (ref 0–2)
COVID-19 SPIKE DOMAIN AB INTERP: POSITIVE
COVID-19 SPIKE DOMAIN ANTIBODY RESULT: >250 U/ML — HIGH
EOSINOPHIL # BLD AUTO: 0.14 K/UL — SIGNIFICANT CHANGE UP (ref 0–0.5)
EOSINOPHIL NFR BLD AUTO: 2.1 % — SIGNIFICANT CHANGE UP (ref 0–6)
HCT VFR BLD CALC: 33.1 % — LOW (ref 34.5–45)
HGB BLD-MCNC: 11.1 G/DL — LOW (ref 11.5–15.5)
IMM GRANULOCYTES NFR BLD AUTO: 0.5 % — SIGNIFICANT CHANGE UP (ref 0–1.5)
LYMPHOCYTES # BLD AUTO: 2.62 K/UL — SIGNIFICANT CHANGE UP (ref 1–3.3)
LYMPHOCYTES # BLD AUTO: 39.6 % — SIGNIFICANT CHANGE UP (ref 13–44)
MCHC RBC-ENTMCNC: 32.3 PG — SIGNIFICANT CHANGE UP (ref 27–34)
MCHC RBC-ENTMCNC: 33.5 GM/DL — SIGNIFICANT CHANGE UP (ref 32–36)
MCV RBC AUTO: 96.2 FL — SIGNIFICANT CHANGE UP (ref 80–100)
MONOCYTES # BLD AUTO: 0.41 K/UL — SIGNIFICANT CHANGE UP (ref 0–0.9)
MONOCYTES NFR BLD AUTO: 6.2 % — SIGNIFICANT CHANGE UP (ref 2–14)
NEUTROPHILS # BLD AUTO: 3.37 K/UL — SIGNIFICANT CHANGE UP (ref 1.8–7.4)
NEUTROPHILS NFR BLD AUTO: 51 % — SIGNIFICANT CHANGE UP (ref 43–77)
NRBC # BLD: 0 /100 WBCS — SIGNIFICANT CHANGE UP (ref 0–0)
PLATELET # BLD AUTO: 181 K/UL — SIGNIFICANT CHANGE UP (ref 150–400)
RBC # BLD: 3.44 M/UL — LOW (ref 3.8–5.2)
RBC # FLD: 13.2 % — SIGNIFICANT CHANGE UP (ref 10.3–14.5)
SARS-COV-2 IGG+IGM SERPL QL IA: >250 U/ML — HIGH
SARS-COV-2 IGG+IGM SERPL QL IA: POSITIVE
SARS-COV-2 RNA SPEC QL NAA+PROBE: SIGNIFICANT CHANGE UP
WBC # BLD: 6.61 K/UL — SIGNIFICANT CHANGE UP (ref 3.8–10.5)
WBC # FLD AUTO: 6.61 K/UL — SIGNIFICANT CHANGE UP (ref 3.8–10.5)

## 2021-09-19 PROCEDURE — U0003: CPT

## 2021-09-19 PROCEDURE — C9803: CPT

## 2021-09-19 RX ORDER — HYDROCORTISONE 1 %
1 OINTMENT (GRAM) TOPICAL EVERY 6 HOURS
Refills: 0 | Status: DISCONTINUED | OUTPATIENT
Start: 2021-09-19 | End: 2021-09-21

## 2021-09-19 RX ORDER — LEVOTHYROXINE SODIUM 125 MCG
88 TABLET ORAL DAILY
Refills: 0 | Status: DISCONTINUED | OUTPATIENT
Start: 2021-09-19 | End: 2021-09-21

## 2021-09-19 RX ORDER — PRAMOXINE HYDROCHLORIDE 150 MG/15G
1 AEROSOL, FOAM RECTAL EVERY 4 HOURS
Refills: 0 | Status: DISCONTINUED | OUTPATIENT
Start: 2021-09-19 | End: 2021-09-21

## 2021-09-19 RX ORDER — OXYTOCIN 10 UNIT/ML
333.33 VIAL (ML) INJECTION
Qty: 20 | Refills: 0 | Status: DISCONTINUED | OUTPATIENT
Start: 2021-09-19 | End: 2021-09-21

## 2021-09-19 RX ORDER — IBUPROFEN 200 MG
600 TABLET ORAL EVERY 6 HOURS
Refills: 0 | Status: COMPLETED | OUTPATIENT
Start: 2021-09-19 | End: 2022-08-18

## 2021-09-19 RX ORDER — KETOROLAC TROMETHAMINE 30 MG/ML
30 SYRINGE (ML) INJECTION ONCE
Refills: 0 | Status: DISCONTINUED | OUTPATIENT
Start: 2021-09-19 | End: 2021-09-19

## 2021-09-19 RX ORDER — OXYCODONE HYDROCHLORIDE 5 MG/1
5 TABLET ORAL
Refills: 0 | Status: DISCONTINUED | OUTPATIENT
Start: 2021-09-19 | End: 2021-09-21

## 2021-09-19 RX ORDER — DIPHENHYDRAMINE HCL 50 MG
25 CAPSULE ORAL EVERY 6 HOURS
Refills: 0 | Status: DISCONTINUED | OUTPATIENT
Start: 2021-09-19 | End: 2021-09-21

## 2021-09-19 RX ORDER — AER TRAVELER 0.5 G/1
1 SOLUTION RECTAL; TOPICAL EVERY 4 HOURS
Refills: 0 | Status: DISCONTINUED | OUTPATIENT
Start: 2021-09-19 | End: 2021-09-21

## 2021-09-19 RX ORDER — BENZOCAINE 10 %
1 GEL (GRAM) MUCOUS MEMBRANE EVERY 6 HOURS
Refills: 0 | Status: DISCONTINUED | OUTPATIENT
Start: 2021-09-19 | End: 2021-09-21

## 2021-09-19 RX ORDER — SIMETHICONE 80 MG/1
80 TABLET, CHEWABLE ORAL EVERY 4 HOURS
Refills: 0 | Status: DISCONTINUED | OUTPATIENT
Start: 2021-09-19 | End: 2021-09-21

## 2021-09-19 RX ORDER — LANOLIN
1 OINTMENT (GRAM) TOPICAL EVERY 6 HOURS
Refills: 0 | Status: DISCONTINUED | OUTPATIENT
Start: 2021-09-19 | End: 2021-09-21

## 2021-09-19 RX ORDER — IBUPROFEN 200 MG
600 TABLET ORAL EVERY 6 HOURS
Refills: 0 | Status: DISCONTINUED | OUTPATIENT
Start: 2021-09-19 | End: 2021-09-21

## 2021-09-19 RX ORDER — ACETAMINOPHEN 500 MG
975 TABLET ORAL
Refills: 0 | Status: DISCONTINUED | OUTPATIENT
Start: 2021-09-19 | End: 2021-09-21

## 2021-09-19 RX ORDER — DIBUCAINE 1 %
1 OINTMENT (GRAM) RECTAL EVERY 6 HOURS
Refills: 0 | Status: DISCONTINUED | OUTPATIENT
Start: 2021-09-19 | End: 2021-09-21

## 2021-09-19 RX ORDER — SODIUM CHLORIDE 9 MG/ML
3 INJECTION INTRAMUSCULAR; INTRAVENOUS; SUBCUTANEOUS EVERY 8 HOURS
Refills: 0 | Status: DISCONTINUED | OUTPATIENT
Start: 2021-09-19 | End: 2021-09-21

## 2021-09-19 RX ORDER — MAGNESIUM HYDROXIDE 400 MG/1
30 TABLET, CHEWABLE ORAL
Refills: 0 | Status: DISCONTINUED | OUTPATIENT
Start: 2021-09-19 | End: 2021-09-21

## 2021-09-19 RX ORDER — OXYTOCIN 10 UNIT/ML
4 VIAL (ML) INJECTION
Qty: 30 | Refills: 0 | Status: DISCONTINUED | OUTPATIENT
Start: 2021-09-19 | End: 2021-09-21

## 2021-09-19 RX ORDER — OXYCODONE HYDROCHLORIDE 5 MG/1
5 TABLET ORAL ONCE
Refills: 0 | Status: DISCONTINUED | OUTPATIENT
Start: 2021-09-19 | End: 2021-09-21

## 2021-09-19 RX ORDER — TETANUS TOXOID, REDUCED DIPHTHERIA TOXOID AND ACELLULAR PERTUSSIS VACCINE, ADSORBED 5; 2.5; 8; 8; 2.5 [IU]/.5ML; [IU]/.5ML; UG/.5ML; UG/.5ML; UG/.5ML
0.5 SUSPENSION INTRAMUSCULAR ONCE
Refills: 0 | Status: DISCONTINUED | OUTPATIENT
Start: 2021-09-19 | End: 2021-09-21

## 2021-09-19 RX ADMIN — Medication 30 MILLIGRAM(S): at 16:57

## 2021-09-19 RX ADMIN — Medication 4 MILLIUNIT(S)/MIN: at 06:05

## 2021-09-19 RX ADMIN — Medication 1000 MILLIUNIT(S)/MIN: at 16:57

## 2021-09-19 NOTE — OB PROVIDER H&P - HISTORY OF PRESENT ILLNESS
35y  at 36weeks GA presents to L&D for IOL for ICP. Patient denies vaginal bleeding, contractions and leakage of fluid. She endorses good fetal movement. Denies fevers, chills, nausea and vomiting. No other complaints at this time.     HUBER:10/17/2021      POB:  , , , 2019 largest 7#+, smallest 6#8, MAB  s/p D+C  PGYN: -fibroids, -ovarian cysts, denies STD hx, denies abnormal PAPs   PMH: Hypothryoidism  PSH: D+C , Foot surgery 19 years ago.   SH: Denies EtOH, tobacco and illicit drug use during this pregnancy; feels safe at home   Meds: PNVs, Synthroid 88 mcg   Allergies: NKDA    BMI:  Sono:  EFW:    GBS:   HIV:   RPR:  HepB:  Rubella:   ABO:     T(C): 36.6 (21 @ 00:11), Max: 36.6 (21 @ 00:11)  HR: 64 (21 @ 00:26) (63 - 73)  BP: 99/53 (21 @ 00:11) (99/53 - 99/53)  RR: 16 (21 @ 00:11) (16 - 16)  SpO2: 97% (21 @ 00:26) (96% - 98%)  Gen: NAD, well-appearing   Lungs: CTAB  Heart: RRR   Abd: Soft, gravid  SVE:    Bedside sono:  FHT:  [place]:           A/P:   -Admit to L&D  -Consent  -Admission labs  -NPO, except ice chips   -IV fluids  -Labor: Intact/*ROM. Latent/Active labor. Ajit *.   -Fetus: Cat I tracing. Continuous toco and fetal monitoring.   -GBS: Negative, no GBS ppx required   -Analgesia:   -DVT ppx: Ambulate and SCD's while in bed     Discussed with   35y  at 36weeks GA presents to L&D for IOL for ICP. Patient denies vaginal bleeding, contractions and leakage of fluid. She endorses good fetal movement. Denies fevers, chills, nausea and vomiting. No other complaints at this time.     HUBER:10/17/2021    POB:  , , , 2019 largest 7#+, smallest 6#8, MAB  s/p D+C  PGYN: -fibroids, -ovarian cysts, denies STD hx, denies abnormal PAPs   PMH: Hypothryoidism  PSH: D+C , Foot surgery 19 years ago.   SH: Denies EtOH, tobacco and illicit drug use during this pregnancy; feels safe at home   Meds: PNVs, Iron, B12, Synthroid 88 mcg ///, 88 mcg BID /, 25 mcg Escobar, ASA 81mcg/ 162mcg, Nystatin Foot Powder  Allergies: sulfa (hives and full body rash)    Sono: vertex  EFW: 2750    GBS: neg      35y  at 36weeks GA presents to L&D for IOL for ICP. Patient denies vaginal bleeding, contractions and leakage of fluid. She endorses good fetal movement. Denies fevers, chills, nausea and vomiting. No other complaints at this time.     HUBER:10/17/2021    POB:  , , , 2019 largest 7#+, smallest 6#8, MAB  s/p D+C  PGYN: -fibroids, -ovarian cysts, denies STD hx, denies abnormal PAPs   PMH: Hypothryoidism  PSH: D+C , Foot surgery 19 years ago.   SH: Denies EtOH, tobacco and illicit drug use during this pregnancy; feels safe at home   Meds: USDA 500 TID, PNVs, Iron, B12, Synthroid 88 mcg ///, 88 mcg BID /, 25 mcg Escobar, ASA 81mcg/ 162mcg, Nystatin Foot Powder  Allergies: sulfa (hives and full body rash)    Sono: vertex  EFW: 2750    GBS: neg      35y  at 36weeks GA presents to L&D for IOL for ICP (). Patient denies vaginal bleeding, contractions and leakage of fluid. She endorses good fetal movement. Denies fevers, chills, nausea and vomiting. No other complaints at this time.     HUBER:10/17/2021    POB:  , , , 2019 largest 7#+, smallest 6#8, MAB  s/p D+C  PGYN: -fibroids, -ovarian cysts, denies STD hx, denies abnormal PAPs   PMH: Hypothryoidism  PSH: D+C , Foot surgery 19 years ago.   SH: Denies EtOH, tobacco and illicit drug use during this pregnancy; feels safe at home   Meds: USDA 500 TID, PNVs, Iron, B12, Synthroid 88 mcg ///, 88 mcg BID /, 25 mcg Escobar, ASA 81mcg/ 162mcg, Nystatin Foot Powder  Allergies: sulfa (hives and full body rash)    Sono: vertex  EFW: 2750    GBS: neg

## 2021-09-19 NOTE — OB PROVIDER IHI INDUCTION/AUGMENTATION NOTE - LABOR: CERVICAL DILATION
1-1.9 cm Opioid Pregnancy And Lactation Text: These medications can lead to premature delivery and should be avoided during pregnancy. These medications are also present in breast milk in small amounts.

## 2021-09-19 NOTE — PRE-ANESTHESIA EVALUATION ADULT - NSANTHOSAYNRD_GEN_A_CORE
No. [place] screening performed.  STOP BANG Legend: 0-2 = LOW Risk; 3-4 = INTERMEDIATE Risk; 5-8 = HIGH Risk
No. [place]LIN screening performed.  STOP BANG Legend: 0-2 = LOW Risk; 3-4 = INTERMEDIATE Risk; 5-8 = HIGH Risk

## 2021-09-19 NOTE — OB PROVIDER H&P - ASSESSMENT
A/P:   -Admit to L&D  -Consent  -Admission labs  -NPO, except ice chips   -IV fluids  -Labor: Intact. Inducing Labor with Vag Cytotec  -Fetus: Cat I tracing. Continuous toco and fetal monitoring.   -GBS: Negative, no GBS ppx required   -DVT ppx: Ambulate and SCD's while in bed     Discussed with Dr. Ángel Linton, PGY1

## 2021-09-19 NOTE — OB RN PATIENT PROFILE - NSTOBACCONEVERSMOKERY/N_GEN_A
[FreeTextEntry1] : Imp:\par JENNI - hx of Rectal cancer\par  s/p exploratory laparotomy, lysis of adhesions and release of internal hernia,  small bowel resection, mesenteric lymph node resection on 10/16/19 for a SBO.\par  Final path was benign with reactive LNs.\par \par Plan:\par Bloodwork and imaging by Dr. Jones\par RTO in 3 months
No

## 2021-09-19 NOTE — OB RN PATIENT PROFILE - NS_OTHER_OBGYN_ALL_OB_FT
I will SWITCH the dose or number of times a day I take the medications listed below when I get home from the hospital:  None
cholestasis

## 2021-09-19 NOTE — OB RN DELIVERY SUMMARY - NSSELHIDDEN_OBGYN_ALL_OB_FT
[NS_DeliveryAttending1_OBGYN_ALL_OB_FT:[email or web address]=],[NS_DeliveryRN_OBGYN_ALL_OB_FT:[id number]==]

## 2021-09-19 NOTE — OB RN PATIENT PROFILE - TEMPERATURE IN CELSIUS (DEGREES C)
Motrin 120 mg by mouth every 6 hours as needed for fever/pain  Encourage fluids  Follow up with pediatrician and ENT listed below  Return for worsening symptoms or concerns
36.6

## 2021-09-19 NOTE — OB RN DELIVERY SUMMARY - NS_SEPSISRSKCALC_OBGYN_ALL_OB_FT
EOS calculated successfully. EOS Risk Factor: 0.5/1000 live births ([facility] national incidence); GA=36w;Temp=98.06; ROM=1.983; GBS='Negative'; Antibiotics='No antibiotics or any antibiotics < 2 hrs prior to birth'

## 2021-09-19 NOTE — OB RN PATIENT PROFILE - NSICDXPASTMEDICALHX_GEN_ALL_CORE_FT
PAST MEDICAL HISTORY:  H/O foot surgery     History of cholestasis during pregnancy     Hypothyroidism 88 mcg qd     (normal spontaneous vaginal delivery) x4

## 2021-09-19 NOTE — OB PROVIDER DELIVERY SUMMARY - NSPROVIDERDELIVERYNOTE_OBGYN_ALL_OB_FT
, pt delivered of a viable male infant apgar 9/9, placenta complete, uterus explored. tiny first degree laceration. one stitch. ebl 200 cc.

## 2021-09-19 NOTE — OB RN PATIENT PROFILE - NS PRO TDAP CONTRAINDICATIONS
as per MD, pt to receive vaccine after delivery/a history of a severe allergic reaction (e.g., anaphylaxis) after a previous dose of Td or to a Td or Tdap component.

## 2021-09-19 NOTE — OB PROVIDER H&P - NSHPPHYSICALEXAM_GEN_ALL_CORE
T(C): 36.6 (09-19-21 @ 00:11), Max: 36.6 (09-19-21 @ 00:11)  HR: 64 (09-19-21 @ 00:26) (63 - 73)  BP: 99/53 (09-19-21 @ 00:11) (99/53 - 99/53)  RR: 16 (09-19-21 @ 00:11) (16 - 16)  SpO2: 97% (09-19-21 @ 00:26) (96% - 98%)  Gen: NAD, well-appearing   Lungs: CTAB  Heart: RRR   Abd: Soft, gravid  SVE:  1/50/-3  Bedside sono:  FHT: 145/mod delaney/+acels/-decels  [place]: irregular

## 2021-09-20 ENCOUNTER — TRANSCRIPTION ENCOUNTER (OUTPATIENT)
Age: 36
End: 2021-09-20

## 2021-09-20 LAB — T PALLIDUM AB TITR SER: NEGATIVE — SIGNIFICANT CHANGE UP

## 2021-09-20 RX ORDER — IBUPROFEN 200 MG
1 TABLET ORAL
Qty: 0 | Refills: 0 | DISCHARGE
Start: 2021-09-20

## 2021-09-20 RX ORDER — LEVOTHYROXINE SODIUM 125 MCG
1 TABLET ORAL
Qty: 0 | Refills: 0 | DISCHARGE

## 2021-09-20 RX ORDER — TETANUS TOXOID, REDUCED DIPHTHERIA TOXOID AND ACELLULAR PERTUSSIS VACCINE, ADSORBED 5; 2.5; 8; 8; 2.5 [IU]/.5ML; [IU]/.5ML; UG/.5ML; UG/.5ML; UG/.5ML
0.5 SUSPENSION INTRAMUSCULAR ONCE
Refills: 0 | Status: COMPLETED | OUTPATIENT
Start: 2021-09-20 | End: 2021-09-20

## 2021-09-20 RX ORDER — ACETAMINOPHEN 500 MG
3 TABLET ORAL
Qty: 0 | Refills: 0 | DISCHARGE
Start: 2021-09-20

## 2021-09-20 RX ADMIN — Medication 600 MILLIGRAM(S): at 06:22

## 2021-09-20 RX ADMIN — Medication 88 MICROGRAM(S): at 07:21

## 2021-09-20 RX ADMIN — Medication 600 MILLIGRAM(S): at 01:05

## 2021-09-20 RX ADMIN — TETANUS TOXOID, REDUCED DIPHTHERIA TOXOID AND ACELLULAR PERTUSSIS VACCINE, ADSORBED 0.5 MILLILITER(S): 5; 2.5; 8; 8; 2.5 SUSPENSION INTRAMUSCULAR at 18:21

## 2021-09-20 RX ADMIN — Medication 600 MILLIGRAM(S): at 07:00

## 2021-09-20 RX ADMIN — Medication 600 MILLIGRAM(S): at 15:25

## 2021-09-20 RX ADMIN — Medication 600 MILLIGRAM(S): at 15:55

## 2021-09-20 RX ADMIN — Medication 600 MILLIGRAM(S): at 00:36

## 2021-09-20 NOTE — DISCHARGE NOTE OB - CARE PLAN
1 Principal Discharge DX:	Vaginal delivery  Assessment and plan of treatment:	Return to activities of daily living

## 2021-09-20 NOTE — DISCHARGE NOTE OB - MEDICATION SUMMARY - MEDICATIONS TO TAKE
I will START or STAY ON the medications listed below when I get home from the hospital:    acetaminophen 325 mg oral tablet  -- 3 tab(s) by mouth   -- Indication: For  (normal spontaneous vaginal delivery), pain control    ibuprofen 600 mg oral tablet  -- 1 tab(s) by mouth every 6 hours  -- Indication: For  (normal spontaneous vaginal delivery), pain control

## 2021-09-20 NOTE — PROGRESS NOTE ADULT - SUBJECTIVE AND OBJECTIVE BOX
Postpartum Note- PPD#1    S: Patient is a  35y  PPD#1 s/p .  Patient w/o complaints, pain is controlled.    Pt is OOB, tolerating PO, passing flatus. Lochia WNL. Patient is breast feeding.     O:  Vital Signs Last 24 Hrs  T(C): 36.7 (20 Sep 2021 05:36), Max: 37.2 (19 Sep 2021 15:36)  T(F): 98.1 (20 Sep 2021 05:36), Max: 99 (19 Sep 2021 15:36)  HR: 52 (20 Sep 2021 05:36) (50 - 78)  BP: 93/60 (20 Sep 2021 05:36) (83/53 - 106/57)  BP(mean): --  RR: 18 (20 Sep 2021 05:36) (16 - 18)  SpO2: 99% (20 Sep 2021 05:36) (75% - 100%)     Gen: NAD  Abdomen: Soft, nontender, non-distended, fundus firm.  Lochia WNL  Ext: Neg edema, Neg calf tenderness    LABS:  Hemoglobin: 11.1 g/dL (21 @ 00:30)  Hematocrit: 33.1 % (21 @ 00:30)      A/P:  35y PPD#1 s/p , doing well  -Increase OOB  -Regular diet  -PO Pain protocol  -Continue routine post partum care    Radha Link PA-C       Postpartum Note- PPD#1    S: Patient is a  35y  PPD#1 s/p  with an EBL of 200cc.  Patient w/o complaints, pain is controlled.    Pt is OOB, tolerating PO, passing flatus. Lochia WNL. Patient is breast feeding.     O:  Vital Signs Last 24 Hrs  T(C): 36.7 (20 Sep 2021 05:36), Max: 37.2 (19 Sep 2021 15:36)  T(F): 98.1 (20 Sep 2021 05:36), Max: 99 (19 Sep 2021 15:36)  HR: 52 (20 Sep 2021 05:36) (50 - 78)  BP: 93/60 (20 Sep 2021 05:36) (83/53 - 106/57)  BP(mean): --  RR: 18 (20 Sep 2021 05:36) (16 - 18)  SpO2: 99% (20 Sep 2021 05:36) (75% - 100%)     Gen: NAD  Abdomen: Soft, nontender, non-distended, fundus firm.  Lochia WNL  Ext: Neg edema, Neg calf tenderness    LABS:  Hemoglobin: 11.1 g/dL (21 @ 00:30)  Hematocrit: 33.1 % (21 @ 00:30)      A/P:  35y PPD#1 s/p , doing well  -Increase OOB  -Regular diet  -PO Pain protocol  -Continue routine post partum care    Radha Link PA-C       Postpartum Note- PPD#1    Rubella: Immune  RPR: Non reactive  Blood type: B+    S: Patient is a  35y  PPD#1 s/p  with an EBL of 200cc.  Patient w/o complaints, pain is controlled.    Pt is OOB, tolerating PO, passing flatus. Lochia WNL. Patient is breast feeding.     O:  Vital Signs Last 24 Hrs  T(C): 36.7 (20 Sep 2021 05:36), Max: 37.2 (19 Sep 2021 15:36)  T(F): 98.1 (20 Sep 2021 05:36), Max: 99 (19 Sep 2021 15:36)  HR: 52 (20 Sep 2021 05:36) (50 - 78)  BP: 93/60 (20 Sep 2021 05:36) (83/53 - 106/57)  BP(mean): --  RR: 18 (20 Sep 2021 05:36) (16 - 18)  SpO2: 99% (20 Sep 2021 05:36) (75% - 100%)     Gen: NAD  Abdomen: Soft, nontender, non-distended, fundus firm.  Lochia WNL  Ext: Neg edema, Neg calf tenderness    LABS:  Hemoglobin: 11.1 g/dL (21 @ 00:30)  Hematocrit: 33.1 % (21 @ 00:30)      A/P:  35y PPD#1 s/p , doing well  -Increase OOB  -Regular diet  -PO Pain protocol  -Continue routine post partum care    Radha Link PA-C       Postpartum Note- PPD#1    Rubella: Immune  RPR: Negative  Blood type: B+    S: Patient is a  35y  PPD#1 s/p  with an EBL of 200cc.  Patient w/o complaints, pain is controlled.    Pt is OOB, tolerating PO, passing flatus. Lochia WNL. Patient is breast feeding.     O:  Vital Signs Last 24 Hrs  T(C): 36.7 (20 Sep 2021 05:36), Max: 37.2 (19 Sep 2021 15:36)  T(F): 98.1 (20 Sep 2021 05:36), Max: 99 (19 Sep 2021 15:36)  HR: 52 (20 Sep 2021 05:36) (50 - 78)  BP: 93/60 (20 Sep 2021 05:36) (83/53 - 106/57)  BP(mean): --  RR: 18 (20 Sep 2021 05:36) (16 - 18)  SpO2: 99% (20 Sep 2021 05:36) (75% - 100%)     Gen: NAD  Abdomen: Soft, nontender, non-distended, fundus firm.  Lochia WNL  Ext: Neg edema, Neg calf tenderness    LABS:  Hemoglobin: 11.1 g/dL (21 @ 00:30)  Hematocrit: 33.1 % (21 @ 00:30)      A/P:  35y PPD#1 s/p , doing well  -Increase OOB  -Regular diet  -PO Pain protocol  -Continue routine post partum care    Radha Link PA-C

## 2021-09-20 NOTE — DISCHARGE NOTE OB - PATIENT PORTAL LINK FT
You can access the FollowMyHealth Patient Portal offered by [facility] by registering at the following website: [email or web address]. By joining [employer]’s FollowMyHealth portal, you will also be able to view your health information using other applications (apps) compatible with our system.

## 2021-09-20 NOTE — DISCHARGE NOTE OB - HOSPITAL COURSE
Labor at term. Normal spontaneous vaginal delivery. Uncomplicated hospital course. On day of discharge, patient was ambulating, tolerating PO and pain was well controlled. Patient instructed to f/u with OB in 4 weeks. Labor at term. Normal spontaneous vaginal delivery. Uncomplicated hospital course. On day of discharge, patient was ambulating, voiding, tolerating PO and pain was well controlled. Patient instructed to f/u with OB in 4 weeks.

## 2021-09-20 NOTE — DISCHARGE NOTE OB - MATERIALS PROVIDED
[facility] Screening Program/Breastfeeding Log/Breastfeeding Mother’s Support Group Information/Guide to Postpartum Care/[facility] Hearing Screen Program/Back To Sleep Handout/Shaken Baby Prevention Handout/Breastfeeding Guide and Packet/Birth Certificate Instructions/Discharge Medication Information for Patients and Families Pocket Guide

## 2021-09-20 NOTE — DISCHARGE NOTE OB - CARE PROVIDER_API CALL
Radha Neal  OBSTETRICS AND GYNECOLOGY  [address], [address]  [place] [address]  Phone: [phone]  Fax: [phone]  Follow Up Time:

## 2021-09-21 VITALS
DIASTOLIC BLOOD PRESSURE: 55 MMHG | HEART RATE: 51 BPM | RESPIRATION RATE: 17 BRPM | SYSTOLIC BLOOD PRESSURE: 92 MMHG | OXYGEN SATURATION: 97 % | TEMPERATURE: 98 F

## 2021-09-21 PROCEDURE — 86850 RBC ANTIBODY SCREEN: CPT

## 2021-09-21 PROCEDURE — 86900 BLOOD TYPING SEROLOGIC ABO: CPT

## 2021-09-21 PROCEDURE — 59025 FETAL NON-STRESS TEST: CPT

## 2021-09-21 PROCEDURE — 86769 SARS-COV-2 COVID-19 ANTIBODY: CPT

## 2021-09-21 PROCEDURE — 86780 TREPONEMA PALLIDUM: CPT

## 2021-09-21 PROCEDURE — 86901 BLOOD TYPING SEROLOGIC RH(D): CPT

## 2021-09-21 PROCEDURE — 85025 COMPLETE CBC W/AUTO DIFF WBC: CPT

## 2021-09-21 PROCEDURE — 90715 TDAP VACCINE 7 YRS/> IM: CPT

## 2021-09-21 PROCEDURE — 59050 FETAL MONITOR W/REPORT: CPT

## 2021-09-21 RX ORDER — BACITRACIN ZINC NEOMYCIN SULFATE POLYMYXIN B SULFATE 400; 3.5; 5 [IU]/G; MG/G; [IU]/G
1 OINTMENT TOPICAL
Refills: 0 | Status: DISCONTINUED | OUTPATIENT
Start: 2021-09-21 | End: 2021-09-21

## 2021-09-21 RX ADMIN — Medication 600 MILLIGRAM(S): at 03:42

## 2021-09-21 RX ADMIN — BACITRACIN ZINC NEOMYCIN SULFATE POLYMYXIN B SULFATE 1 APPLICATION(S): 400; 3.5; 5 OINTMENT TOPICAL at 06:26

## 2021-09-21 RX ADMIN — Medication 600 MILLIGRAM(S): at 09:43

## 2021-09-21 RX ADMIN — Medication 88 MICROGRAM(S): at 06:26

## 2021-09-21 RX ADMIN — Medication 600 MILLIGRAM(S): at 02:42

## 2021-09-21 RX ADMIN — Medication 600 MILLIGRAM(S): at 10:13

## 2022-05-03 ENCOUNTER — RESULT REVIEW (OUTPATIENT)
Age: 37
End: 2022-05-03

## 2022-08-31 NOTE — PATIENT PROFILE OB - BREASTFEEDING PROVIDES STABLE TEMPERATURE THROUGH SKIN TO SKIN CONTACT
SARANYA    GA: 32w3d   22  1059   BP: 100/63   Pulse: 83   Weight: 130 lb 0.6 oz (59 kg)   Height: 59\"       Pt c/o nausea is returning, taking zofran once a day, suggested to try vitamin b6 with her unisom at night. +FM,    Denies LOF/VB/uctx  Rh positive TDAP 2022 eceived,   PTL and Fetal movement instructions given        Problem List Items Addressed This Visit     Supervision of normal first pregnancy, antepartum - Primary    Relevant Orders    URINALYSIS NONAUTO W/O SCOPE (OB URISTIX)          RTC in 2 wks      Note to patient and family   The [facility] makes medical notes available to patients in the interest of transparency. However, please be advised that this is a medical document. It is intended as yqfy-el-smfw communication. It is written and medical language may contain abbreviations or verbiage that are technical and unfamiliar. It may appear blunt or direct. Medical documents are intended to carry relevant information, facts as evident, and the clinical opinion of the practitioner.
Statement Selected

## 2023-04-03 NOTE — OB RN PATIENT PROFILE - NS_FINALEDD_OBGYN_ALL_OB_DT
Has Your Skin Lesion Been Treated?: not been treated
Is This A New Presentation, Or A Follow-Up?: Skin Lesions
17-Oct-2021
